# Patient Record
Sex: FEMALE
[De-identification: names, ages, dates, MRNs, and addresses within clinical notes are randomized per-mention and may not be internally consistent; named-entity substitution may affect disease eponyms.]

---

## 2017-10-09 ENCOUNTER — RX ONLY (OUTPATIENT)
Age: 37
Setting detail: RX ONLY
End: 2017-10-09

## 2019-01-29 ENCOUNTER — RX ONLY (OUTPATIENT)
Age: 39
Setting detail: RX ONLY
End: 2019-01-29

## 2021-03-05 ENCOUNTER — RX ONLY (OUTPATIENT)
Age: 41
Setting detail: RX ONLY
End: 2021-03-05

## 2021-03-05 RX ORDER — ADAPALENE 3 MG/G
GEL TOPICAL
Qty: 1 | Refills: 6 | Status: CANCELLED
Stop reason: CLARIF

## 2021-08-10 ENCOUNTER — RX ONLY (OUTPATIENT)
Age: 41
Setting detail: RX ONLY
End: 2021-08-10

## 2021-08-10 RX ORDER — ADAPALENE 3 MG/G
GEL TOPICAL
Qty: 1 | Refills: 3 | Status: ERX | COMMUNITY
Start: 2021-08-10

## 2022-03-24 ENCOUNTER — APPOINTMENT (RX ONLY)
Dept: URBAN - METROPOLITAN AREA CLINIC 56 | Facility: CLINIC | Age: 42
Setting detail: DERMATOLOGY
End: 2022-03-24

## 2022-03-24 DIAGNOSIS — Z80.8 FAMILY HISTORY OF MALIGNANT NEOPLASM OF OTHER ORGANS OR SYSTEMS: ICD-10-CM

## 2022-03-24 DIAGNOSIS — L72.0 EPIDERMAL CYST: ICD-10-CM

## 2022-03-24 DIAGNOSIS — L70.0 ACNE VULGARIS: ICD-10-CM

## 2022-03-24 DIAGNOSIS — D18.0 HEMANGIOMA: ICD-10-CM

## 2022-03-24 DIAGNOSIS — D22 MELANOCYTIC NEVI: ICD-10-CM

## 2022-03-24 DIAGNOSIS — B36.0 PITYRIASIS VERSICOLOR: ICD-10-CM

## 2022-03-24 PROBLEM — D18.01 HEMANGIOMA OF SKIN AND SUBCUTANEOUS TISSUE: Status: ACTIVE | Noted: 2022-03-24

## 2022-03-24 PROBLEM — D22.5 MELANOCYTIC NEVI OF TRUNK: Status: ACTIVE | Noted: 2022-03-24

## 2022-03-24 PROCEDURE — ? PRESCRIPTION

## 2022-03-24 PROCEDURE — 99213 OFFICE O/P EST LOW 20 MIN: CPT

## 2022-03-24 PROCEDURE — ? KOH PREP

## 2022-03-24 PROCEDURE — ? EXTRACTIONS

## 2022-03-24 PROCEDURE — ? TREATMENT REGIMEN

## 2022-03-24 RX ORDER — ADAPALENE 3 MG/G
GEL TOPICAL
Qty: 45 | Refills: 3 | Status: ERX | COMMUNITY
Start: 2022-03-24

## 2022-03-24 RX ORDER — MICONAZOLE NITRATE 20 MG/G
CREAM TOPICAL BID
Qty: 42.5 | Refills: 1 | Status: ERX | COMMUNITY
Start: 2022-03-24

## 2022-03-24 RX ADMIN — MICONAZOLE NITRATE: 20 CREAM TOPICAL at 00:00

## 2022-03-24 RX ADMIN — ADAPALENE: 3 GEL TOPICAL at 00:00

## 2022-03-24 ASSESSMENT — LOCATION DETAILED DESCRIPTION DERM
LOCATION DETAILED: INFERIOR THORACIC SPINE
LOCATION DETAILED: EPIGASTRIC SKIN
LOCATION DETAILED: RIGHT SUPERIOR MEDIAL MALAR CHEEK
LOCATION DETAILED: RIGHT SUPERIOR UPPER BACK
LOCATION DETAILED: RIGHT MEDIAL UPPER BACK

## 2022-03-24 ASSESSMENT — LOCATION SIMPLE DESCRIPTION DERM
LOCATION SIMPLE: ABDOMEN
LOCATION SIMPLE: RIGHT CHEEK
LOCATION SIMPLE: RIGHT UPPER BACK
LOCATION SIMPLE: UPPER BACK

## 2022-03-24 ASSESSMENT — LOCATION ZONE DERM
LOCATION ZONE: FACE
LOCATION ZONE: TRUNK

## 2022-03-24 NOTE — PROCEDURE: EXTRACTIONS
Render Number Of Lesions Treated: no
Post-Care Instructions: I reviewed with the patient in detail post-care instructions. Patient is to keep the treatment areas dry overnight, and then apply bacitracin twice daily until healed. Patient may apply hydrogen peroxide soaks to remove any crusting.
Acne Type: Comedonal Lesions
Detail Level: Detailed
Extraction Method: 11 blade and comedo extractor
Consent was obtained and risks were reviewed including but not limited to scarring, infection, bleeding, scabbing, incomplete removal, and allergy to anesthesia.
Prep Text (Optional): Prior to removal the treatment areas were prepped in the usual fashion.

## 2022-03-24 NOTE — PROCEDURE: TREATMENT REGIMEN
Detail Level: Zone
Continue Regimen: Adapalene 0.3% gel nightly to areas of facial acne as needed
Initiate Treatment: Miconazole 2% cream twice daily for 3 weeks to the area of tinea versicolor on the right upper back.  Patient was told she may repeat this in the future if the tinea versicolor should recur.  I gave her an informational brochure on tinea versicolor to read.

## 2022-03-24 NOTE — HPI: OTHER
Condition:: General skin exam
Please Describe Your Condition:: Patient returns today requesting general skin exam.  She has a history of 2 relatives were diagnosed with melanoma and would like her moles checked.  She is not aware that any of her moles have changed in size or pigmentation.  She also complains of recent problems with a irritated itchy erythematous scaling rash on the right upper back area.  She saw her primary care physician who gave her a tube of some type of cream to use which is helped slightly.Patient also has problems with acne periodically and uses adapalene 0.3% gel which is effective.

## 2022-03-24 NOTE — PROCEDURE: KOH PREP
Showing: fungal hyphal elements: positive
Koh Intro Text (From The.....): A KOH prep was ordered and evaluated from the
Detail Level: Detailed
Koh Procedure Text (Tissue Harvesting Technique): A 15-blade scalpel was used to scrape the skin. The skin scrapings were placed on a glass slide, covered with a coverslip and a KOH solution was applied.
Cpt Desired:

## 2023-03-29 ENCOUNTER — APPOINTMENT (RX ONLY)
Dept: URBAN - METROPOLITAN AREA CLINIC 56 | Facility: CLINIC | Age: 43
Setting detail: DERMATOLOGY
End: 2023-03-29

## 2023-03-29 DIAGNOSIS — L73.8 OTHER SPECIFIED FOLLICULAR DISORDERS: ICD-10-CM

## 2023-03-29 DIAGNOSIS — B07.0 PLANTAR WART: ICD-10-CM

## 2023-03-29 DIAGNOSIS — L20.89 OTHER ATOPIC DERMATITIS: ICD-10-CM

## 2023-03-29 DIAGNOSIS — L81.2 FRECKLES: ICD-10-CM

## 2023-03-29 DIAGNOSIS — D22 MELANOCYTIC NEVI: ICD-10-CM

## 2023-03-29 PROBLEM — L20.84 INTRINSIC (ALLERGIC) ECZEMA: Status: ACTIVE | Noted: 2023-03-29

## 2023-03-29 PROBLEM — D22.5 MELANOCYTIC NEVI OF TRUNK: Status: ACTIVE | Noted: 2023-03-29

## 2023-03-29 PROCEDURE — 17110 DESTRUCTION B9 LES UP TO 14: CPT

## 2023-03-29 PROCEDURE — ? LIQUID NITROGEN

## 2023-03-29 PROCEDURE — 99213 OFFICE O/P EST LOW 20 MIN: CPT | Mod: 25

## 2023-03-29 PROCEDURE — ? PRESCRIPTION

## 2023-03-29 PROCEDURE — ? TREATMENT REGIMEN

## 2023-03-29 RX ORDER — TRIAMCINOLONE ACETONIDE 1 MG/G
CREAM TOPICAL
Qty: 30 | Refills: 3 | Status: ERX | COMMUNITY
Start: 2023-03-29

## 2023-03-29 RX ADMIN — TRIAMCINOLONE ACETONIDE: 1 CREAM TOPICAL at 00:00

## 2023-03-29 ASSESSMENT — LOCATION ZONE DERM
LOCATION ZONE: FEET
LOCATION ZONE: TRUNK
LOCATION ZONE: LEG
LOCATION ZONE: FACE

## 2023-03-29 ASSESSMENT — LOCATION SIMPLE DESCRIPTION DERM
LOCATION SIMPLE: ABDOMEN
LOCATION SIMPLE: RIGHT PLANTAR SURFACE
LOCATION SIMPLE: RIGHT CALF
LOCATION SIMPLE: RIGHT FOREHEAD
LOCATION SIMPLE: UPPER BACK
LOCATION SIMPLE: RIGHT UPPER BACK

## 2023-03-29 ASSESSMENT — LOCATION DETAILED DESCRIPTION DERM
LOCATION DETAILED: RIGHT PROXIMAL CALF
LOCATION DETAILED: RIGHT RIB CAGE
LOCATION DETAILED: RIGHT MID-UPPER BACK
LOCATION DETAILED: RIGHT SUPERIOR MEDIAL UPPER BACK
LOCATION DETAILED: RIGHT PLANTAR FOREFOOT OVERLYING 2ND METATARSAL
LOCATION DETAILED: RIGHT MEDIAL FOREHEAD
LOCATION DETAILED: SUPERIOR THORACIC SPINE

## 2023-03-29 NOTE — PROCEDURE: LIQUID NITROGEN
Duration Of Freeze Thaw-Cycle (Seconds): 10-15
Post-Care Instructions: I reviewed with the patient in detail post-care instructions. Patient is to wear sunprotection, and avoid picking at any of the treated lesions. Pt may apply Bacitracin to crusted or scabbing areas.
Number Of Freeze-Thaw Cycles: 2 freeze-thaw cycles
Render Post-Care Instructions In Note?: no
Spray Paint Text: The liquid nitrogen was applied to the skin utilizing a spray paint frosting technique.
Show Aperture Variable?: Yes
Application Tool (Optional): Cotton Tipped Applicator
Detail Level: Detailed
Consent: The patient's consent was obtained including but not limited to risks of crusting, scabbing, blistering, scarring, darker or lighter pigmentary change, recurrence, incomplete removal and infection.
Medical Necessity Information: It is in your best interest to select a reason for this procedure from the list below. All of these items fulfill various CMS LCD requirements except the new and changing color options.
Medical Necessity Clause: This procedure was medically necessary because the lesions that were treated were:

## 2023-03-29 NOTE — HPI: OTHER
Condition:: General skin exam
Please Describe Your Condition:: Patient returns requesting general skin exam.  She is bothered byA plantar wart on her foot which is painful at times to walk on.  She would like this treated.  Treatment of the plantar wart with cryosurgical destruction was discussed.  She is worse because swelling and blistering reaction.  She also complains of problems with erythema with dryness, scaling and some itching in patches on her back.  At her last appointment she had tinea versicolor which was treated with miconazole cream.  Patient's general health has been good.  She has a family history of several relatives who had melanoma.

## 2023-03-29 NOTE — PROCEDURE: TREATMENT REGIMEN
Detail Level: Detailed
Initiate Treatment: Triamcinolone 0.1% cream daily as needed to itching on her back.

## 2024-06-14 ENCOUNTER — APPOINTMENT (RX ONLY)
Dept: URBAN - METROPOLITAN AREA CLINIC 56 | Facility: CLINIC | Age: 44
Setting detail: DERMATOLOGY
End: 2024-06-14

## 2024-06-14 DIAGNOSIS — L20.89 OTHER ATOPIC DERMATITIS: ICD-10-CM

## 2024-06-14 DIAGNOSIS — L70.0 ACNE VULGARIS: ICD-10-CM

## 2024-06-14 PROCEDURE — 99213 OFFICE O/P EST LOW 20 MIN: CPT

## 2024-06-14 PROCEDURE — ? PRESCRIPTION

## 2024-06-14 PROCEDURE — ? TREATMENT REGIMEN

## 2024-06-14 RX ORDER — ADAPALENE 3 MG/G
GEL TOPICAL
Qty: 45 | Refills: 3 | Status: ERX

## 2024-06-14 RX ORDER — CLINDAMYCIN PHOSPHATE 10 MG/ML
LOTION TOPICAL
Qty: 60 | Refills: 6 | Status: ERX | COMMUNITY
Start: 2024-06-14

## 2024-06-14 RX ADMIN — CLINDAMYCIN PHOSPHATE: 10 LOTION TOPICAL at 00:00

## 2024-06-14 NOTE — HPI: OTHER
Condition:: Acne and eczema
Please Describe Your Condition:: Patient returns for follow-up of treatment of acne and eczema.  Patient notes breakouts with small inflammatory papules and comedones about her forehead and uses adapalene 0.3% gel at bedtime which seems to be helpful.  If she stops use of the product the acne recurs.  She also continues to have problems with eczema about the abdomen and upper trunk.  In the past she has used triamcinolone 0.1% cream daily which has been helpful.  She has questions about what could be done for areas of telangiectasia about the legs and face.  I recommended she make an appointment for evaluation at [facility] for this.  I gave her one of their catalogs to review

## 2024-06-14 NOTE — PROCEDURE: TREATMENT REGIMEN
Detail Level: Zone
Continue Regimen: Adapalene 0.3% gel nightly to areas of facial acne as needed And start use of clindamycin 1% lotion every morning
Continue Regimen: Triamcinolone 0.1% cream daily as needed to areas of eczema about the trunk and extremities.  Patient will contact the office or her pharmacist if she needs refill prior to her next appointment.

## 2025-06-20 ENCOUNTER — APPOINTMENT (OUTPATIENT)
Dept: URBAN - METROPOLITAN AREA CLINIC 56 | Facility: CLINIC | Age: 45
Setting detail: DERMATOLOGY
End: 2025-06-20

## 2025-06-20 DIAGNOSIS — D22 MELANOCYTIC NEVI: ICD-10-CM

## 2025-06-20 DIAGNOSIS — L70.0 ACNE VULGARIS: ICD-10-CM

## 2025-06-20 DIAGNOSIS — L73.8 OTHER SPECIFIED FOLLICULAR DISORDERS: ICD-10-CM

## 2025-06-20 DIAGNOSIS — Z80.8 FAMILY HISTORY OF MALIGNANT NEOPLASM OF OTHER ORGANS OR SYSTEMS: ICD-10-CM

## 2025-06-20 DIAGNOSIS — L20.89 OTHER ATOPIC DERMATITIS: ICD-10-CM

## 2025-06-20 DIAGNOSIS — L81.2 FRECKLES: ICD-10-CM

## 2025-06-20 PROBLEM — D22.5 MELANOCYTIC NEVI OF TRUNK: Status: ACTIVE | Noted: 2025-06-20

## 2025-06-20 PROCEDURE — ? PRESCRIPTION

## 2025-06-20 PROCEDURE — ? TREATMENT REGIMEN

## 2025-06-20 RX ORDER — ADAPALENE 3 MG/G
GEL TOPICAL
Qty: 45 | Refills: 3 | Status: ERX

## 2025-06-20 RX ORDER — CLINDAMYCIN PHOSPHATE 10 MG/ML
LOTION TOPICAL
Qty: 60 | Refills: 6 | Status: ERX

## 2025-06-20 ASSESSMENT — LOCATION ZONE DERM
LOCATION ZONE: TRUNK
LOCATION ZONE: FACE

## 2025-06-20 ASSESSMENT — LOCATION SIMPLE DESCRIPTION DERM
LOCATION SIMPLE: UPPER BACK
LOCATION SIMPLE: RIGHT UPPER BACK
LOCATION SIMPLE: RIGHT FOREHEAD

## 2025-06-20 ASSESSMENT — LOCATION DETAILED DESCRIPTION DERM
LOCATION DETAILED: RIGHT MEDIAL FOREHEAD
LOCATION DETAILED: SUPERIOR THORACIC SPINE
LOCATION DETAILED: RIGHT SUPERIOR MEDIAL UPPER BACK

## 2025-06-20 NOTE — PROCEDURE: TREATMENT REGIMEN
Detail Level: Zone
Continue Regimen: Triamcinolone 0.1% cream daily as needed to areas of eczema about the trunk and extremities.  Patient will contact the office or her pharmacist if she needs refill prior to her next appointment.
Continue Regimen: Adapalene 0.3% gel nightly to areas of facial acne as needed And start use of clindamycin 1% lotion every morning